# Patient Record
Sex: FEMALE | URBAN - METROPOLITAN AREA
[De-identification: names, ages, dates, MRNs, and addresses within clinical notes are randomized per-mention and may not be internally consistent; named-entity substitution may affect disease eponyms.]

---

## 2022-08-11 ENCOUNTER — OFFICE (OUTPATIENT)
Dept: URBAN - METROPOLITAN AREA CLINIC 19 | Facility: CLINIC | Age: 32
End: 2022-08-11
Payer: COMMERCIAL

## 2022-08-11 VITALS
WEIGHT: 184 LBS | DIASTOLIC BLOOD PRESSURE: 73 MMHG | OXYGEN SATURATION: 100 % | SYSTOLIC BLOOD PRESSURE: 118 MMHG | HEIGHT: 68 IN | HEART RATE: 104 BPM

## 2022-08-11 DIAGNOSIS — Z12.11 ENCOUNTER FOR SCREENING FOR MALIGNANT NEOPLASM OF COLON: ICD-10-CM

## 2022-08-11 DIAGNOSIS — Z15.01 GENETIC SUSCEPTIBILITY TO MALIGNANT NEOPLASM OF BREAST: ICD-10-CM

## 2022-08-11 LAB — CEA: 1 NG/ML (ref 0–4.7)

## 2022-08-11 PROCEDURE — S0285 CNSLT BEFORE SCREEN COLONOSC: HCPCS

## 2022-08-11 RX ORDER — SODIUM PICOSULFATE, MAGNESIUM OXIDE, AND ANHYDROUS CITRIC ACID 10; 3.5; 12 MG/160ML; G/160ML; G/160ML
LIQUID ORAL
Qty: 320 | Refills: 0 | Status: ACTIVE
Start: 2022-08-11

## 2022-08-11 NOTE — SERVICENOTES
The patient's assessment was reviewed with Dr. Sebastian and a collaborative plan of care was established.

I did look into the UpToDate recommendations for a positive CHEK2 mutation for colorectal cancer screening. For those without a first-degree relative with colorectal cancer, a colonoscopy is performed every five years, beginning at age 40.

## 2022-08-11 NOTE — SERVICEHPINOTES
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  This was prompted after her sister has been diagnosed with ovarian cancer.   was initially normal 5/2/22 at 31 (6-38) and CBC was remarkable for normocytic anemia [Hct=36% (37-47%)].  She had a CT abdomen/pelvis at that time does remarkable for a 1.5 cm ovarian cyst.   on 7/7/22 was mildly elevated at 76 and 7/14/22 was elevated at 49. She denies any significant GI symptoms, but does admit to having some occasional "looser" stools depending on what she eats.  She denies reflux, heartburn, nausea, vomiting, dysphagia, abdominal pain, diarrhea, constipation or overt GI bleeding.  She denies any previous GI tests or studies.  Her father has Crohn's disease.  Family history is negative for colon neoplasm.